# Patient Record
Sex: FEMALE | URBAN - METROPOLITAN AREA
[De-identification: names, ages, dates, MRNs, and addresses within clinical notes are randomized per-mention and may not be internally consistent; named-entity substitution may affect disease eponyms.]

---

## 2021-04-08 DIAGNOSIS — Z23 ENCOUNTER FOR IMMUNIZATION: ICD-10-CM

## 2024-03-19 ENCOUNTER — TELEPHONE (OUTPATIENT)
Age: 59
End: 2024-03-19

## 2024-12-27 ENCOUNTER — EVALUATION (OUTPATIENT)
Dept: PHYSICAL THERAPY | Facility: MEDICAL CENTER | Age: 59
End: 2024-12-27
Payer: COMMERCIAL

## 2024-12-27 DIAGNOSIS — M16.12 UNILATERAL PRIMARY OSTEOARTHRITIS, LEFT HIP: Primary | ICD-10-CM

## 2024-12-27 PROCEDURE — 97161 PT EVAL LOW COMPLEX 20 MIN: CPT | Performed by: PHYSICAL THERAPIST

## 2024-12-27 NOTE — LETTER
2024    Amor Pearce MD  [address] [address]    Patient: Barbara Rodriguez   YOB: 1965   Date of Visit: 2024     Encounter Diagnosis     ICD-10-CM    1. Unilateral primary osteoarthritis, left hip  M16.12           Dear Dr. Pearce:    Thank you for your recent referral of Barbara Rodriguez. Please review the attached evaluation summary from Barbara's recent visit.     Please verify that you agree with the plan of care by signing the attached order.     If you have any questions or concerns, please do not hesitate to call.     I sincerely appreciate the opportunity to share in the care of one of your patients and hope to have another opportunity to work with you in the near future.       Sincerely,    Starr Plummer, PT      Referring Provider:      I certify that I have read the below Plan of Care and certify the need for these services furnished under this plan of treatment while under my care.                    Amor Pearce MD  [address] [address]  Via Fax: [phone]          PT Evaluation     Today's date: 2024  Patient name: Barbara Rodriguez  : 1965  MRN: [id number]  Referring provider: Amor Pearce MD  Dx:   Encounter Diagnosis     ICD-10-CM    1. Unilateral primary osteoarthritis, left hip  M16.12                      Assessment  Impairments: abnormal muscle firing, abnormal muscle tone, abnormal or restricted ROM, activity intolerance, impaired physical strength, lacks appropriate home exercise program, pain with function, participation limitations and activity limitations  Functional limitations: squatting, sit to stand transfers, standing  Symptom irritability: low    Assessment details: Barbara Rodriguez is a pleasant 59 y.o. female who presents with chronic L hip pain gradually worsening over the last 8-9 months. She is scheduled for a L CAROL on 25. No further referral is necessary at this  time based upon examination results.    Primary movement impairment is L hip hypomobility, which contributes to signs and symptoms consistent with chronic mechanical L hip pain, and limits her ability to perform transfers and squat. Patient also presents with L hip girdle/quadriceps weakness, which further limits her functional activity tolerance. Patient responded well to manual therapy today with improvements in L hip IR PROM post-tx. She was also educated in an illustrated HEP for hip mobility and hip girdle/quad strengthening, and she was able to complete program without pain. Plan to re-evaluate in 1 month for HEP progressions, gait training with SPC, and post-operative HEP education. Patient would benefit from skilled PT services to address impairments to maximize function. Thank you for the referral.   Barriers to therapy: none  Understanding of Dx/Px/POC: good     Prognosis: good  Prognosis details: Positive prognostic factors include positive attitude towards recovery. No negative prognostic factors.    Goals  STG:  Patient will be independent with home exercise program.   Patient will increase L hip IR PROM by at least 5-10 deg to improve ability to perform transfers.  LTG:  Patient will increase L hip strength by at least 1-2 grades via MMT to improve standing tolerance.  Patient will be able to stand to rise from a chair with less difficulty.  Patient will be independent with post-operative HEP for edema control, hip mobility, and quad activation.    Plan  Patient would benefit from: skilled physical therapy  Referral necessary: No  Planned modality interventions: cryotherapy and TENS    Planned therapy interventions: abdominal trunk stabilization, activity modification, IASTM, kinesiology taping, joint mobilization, manual therapy, massage, Montano taping, balance, neuromuscular re-education, motor coordination training, patient/caregiver education, strengthening, stretching, therapeutic activities,  therapeutic exercise, gait training, functional ROM exercises, flexibility, graded exercise, graded activity, home exercise program and body mechanics training    Frequency: 1x month  Plan of Care beginning date: 2024  Plan of Care expiration date: 2025  Treatment plan discussed with: patient  Plan details: Plan to follow-up in 1 month for hip mobility/strengthening HEP progressions, post-operative gait training with SPC, and post-operative HEP education. Will then re-evaluate after surgery.      Subjective Evaluation    History of Present Illness  Mechanism of injury: This is a 59 y.o. female presenting with L hip pain for the last few years that has gradually worsened over the last 8-9 months. The pain is located in the front of her L hip and occasionally radiates into her thigh. She also occasionally has L glut pain. She received an injection in spring 2024 with some temporary relief. However, the pain progressively returned and limited her ability to squat, run, and don/doff clothing. She received another injection in 2024, which helped her pain and improved her ability to don/doff clothing. However, she continues to have weakness in her L leg along with difficulty rising from a squat. She received x-rays of her L hip that showed arthritis, and she is scheduled for a L CAROL on 25.             Recurrent probem    Quality of life: excellent    Patient Goals  Patient goals for therapy: decreased pain, increased strength and return to sport/leisure activities  Patient goal: to be able to hike, to be able to live active lifestyle  Pain  Current pain ratin  At best pain ratin  At worst pain ratin  Location: L anterior thigh/L glut  Quality: tight  Relieving factors: heat  Aggravating factors: standing (squatting, sit to stand transfers)      Diagnostic Tests  X-ray: abnormal (per patient- arthritis)  Treatments  Previous treatment: injection treatment (x2)      Objective     Active  Range of Motion   Left Hip   Flexion: 100 degrees   Abduction: 55 degrees   External rotation (90/90): 40 degrees   Internal rotation (90/90): 25 degrees     Right Hip   Flexion: 105 degrees   Abduction: 55 degrees   External rotation (90/90): 50 degrees   Internal rotation (90/90): 45 degrees     Passive Range of Motion   Left Hip   Flexion: 100 degrees   Abduction: 55 degrees   External rotation (90/90): 50 degrees   External rotation (prone): 50 degrees   Internal rotation (90/90): 25 degrees   Internal rotation (prone): 25 degrees     Right Hip   Flexion: 105 degrees   Abduction: 55 degrees   External rotation (90/90): 55 degrees   External rotation (prone): 55 degrees   Internal rotation (90/90): 45 degrees   Internal rotation (prone): 30 degrees     Strength/Myotome Testing     Left Hip   Planes of Motion   Flexion: 4  Extension: 3+  Abduction: 3+  External rotation: 3+  Internal rotation: 2+    Right Hip   Planes of Motion   Flexion: 5  Extension: 4+  Abduction: 4+  External rotation: 4+  Internal rotation: 4+    Left Knee   Flexion: 5  Extension: 4  Quadriceps contraction: good    Right Knee   Flexion: 5  Extension: 5  Quadriceps contraction: good    Tests     Left Hip   Positive scour.   Negative LIZETTE and FADIR.     Right Hip   Negative LIZETTE, FADIR and scour.     Functional Assessment      Squat    Left tibial anterior translation beyond toes and right tibial anterior translation beyond toes.     Single Leg Squat   Left Leg  Left trunk side bending, sitting toward left side and valgus.     Right Leg  Right trunk side bending.     Single Leg Stance   Left: 10 seconds  Right: 10 seconds    General Comments:      Hip Comments   Moderate limitations in L piriformis flexibility             Precautions: hx of basal cell carcinoma     *LATEX ALLERGY    HEP: seated piriformis stretch, QS (towel knee), SLR, sidelying hip ABD, sidelying hip ADD, prone hip EXT, supine clams (GTB)- latex free, bridges  Manuals 12/27   "          L hip inferolateral glides KP Gr. III             x5'                                      Neuro Re-Ed                                                                                                        Ther Ex             Rec bike DAVIS NV            QS 5\"x30 towel knee HEP            SLR 2x10 HEP            Sidelying hip ABD 2x10 HEP            Sidelying hip ADD 2x10 HEP            Prone hip EXT 2x10 HEP            Supine clams (LATEX FREE) 5\"x20 GTB HEP             Bridges 2x10 HEP            Seated piriformis stretch 20\"x3 HEP            NV- educate in post-op HEP             Ther Activity                                       Gait Training             Stair negotiation with [facility] NV                         Modalities                                                             "

## 2024-12-27 NOTE — PROGRESS NOTES
PT Evaluation     Today's date: 2024  Patient name: Barbara Rodriguez  : 1965  MRN: [id number]  Referring provider: Amor Pearce MD  Dx:   Encounter Diagnosis     ICD-10-CM    1. Unilateral primary osteoarthritis, left hip  M16.12                      Assessment  Impairments: abnormal muscle firing, abnormal muscle tone, abnormal or restricted ROM, activity intolerance, impaired physical strength, lacks appropriate home exercise program, pain with function, participation limitations and activity limitations  Functional limitations: squatting, sit to stand transfers, standing  Symptom irritability: low    Assessment details: Barbara Rodriguez is a pleasant 59 y.o. female who presents with chronic L hip pain gradually worsening over the last 8-9 months. She is scheduled for a L CAROL on 25. No further referral is necessary at this time based upon examination results.    Primary movement impairment is L hip hypomobility, which contributes to signs and symptoms consistent with chronic mechanical L hip pain, and limits her ability to perform transfers and squat. Patient also presents with L hip girdle/quadriceps weakness, which further limits her functional activity tolerance. Patient responded well to manual therapy today with improvements in L hip IR PROM post-tx. She was also educated in an illustrated HEP for hip mobility and hip girdle/quad strengthening, and she was able to complete program without pain. Plan to re-evaluate in 1 month for HEP progressions, gait training with SPC, and post-operative HEP education. Patient would benefit from skilled PT services to address impairments to maximize function. Thank you for the referral.   Barriers to therapy: none  Understanding of Dx/Px/POC: good     Prognosis: good  Prognosis details: Positive prognostic factors include positive attitude towards recovery. No negative prognostic factors.    Goals  STG:  Patient will be independent with home exercise  program.   Patient will increase L hip IR PROM by at least 5-10 deg to improve ability to perform transfers.  LTG:  Patient will increase L hip strength by at least 1-2 grades via MMT to improve standing tolerance.  Patient will be able to stand to rise from a chair with less difficulty.  Patient will be independent with post-operative HEP for edema control, hip mobility, and quad activation.    Plan  Patient would benefit from: skilled physical therapy  Referral necessary: No  Planned modality interventions: cryotherapy and TENS    Planned therapy interventions: abdominal trunk stabilization, activity modification, IASTM, kinesiology taping, joint mobilization, manual therapy, massage, Montano taping, balance, neuromuscular re-education, motor coordination training, patient/caregiver education, strengthening, stretching, therapeutic activities, therapeutic exercise, gait training, functional ROM exercises, flexibility, graded exercise, graded activity, home exercise program and body mechanics training    Frequency: 1x month  Plan of Care beginning date: 12/27/2024  Plan of Care expiration date: 1/24/2025  Treatment plan discussed with: patient  Plan details: Plan to follow-up in 1 month for hip mobility/strengthening HEP progressions, post-operative gait training with SPC, and post-operative HEP education. Will then re-evaluate after surgery.      Subjective Evaluation    History of Present Illness  Mechanism of injury: This is a 59 y.o. female presenting with L hip pain for the last few years that has gradually worsened over the last 8-9 months. The pain is located in the front of her L hip and occasionally radiates into her thigh. She also occasionally has L glut pain. She received an injection in spring 2024 with some temporary relief. However, the pain progressively returned and limited her ability to squat, run, and don/doff clothing. She received another injection in October 2024, which helped her pain and  improved her ability to don/doff clothing. However, she continues to have weakness in her L leg along with difficulty rising from a squat. She received x-rays of her L hip that showed arthritis, and she is scheduled for a L CAROL on 25.             Recurrent probem    Quality of life: excellent    Patient Goals  Patient goals for therapy: decreased pain, increased strength and return to sport/leisure activities  Patient goal: to be able to hike, to be able to live active lifestyle  Pain  Current pain ratin  At best pain ratin  At worst pain ratin  Location: L anterior thigh/L glut  Quality: tight  Relieving factors: heat  Aggravating factors: standing (squatting, sit to stand transfers)      Diagnostic Tests  X-ray: abnormal (per patient- arthritis)  Treatments  Previous treatment: injection treatment (x2)      Objective     Active Range of Motion   Left Hip   Flexion: 100 degrees   Abduction: 55 degrees   External rotation (90/90): 40 degrees   Internal rotation (90/90): 25 degrees     Right Hip   Flexion: 105 degrees   Abduction: 55 degrees   External rotation (90/90): 50 degrees   Internal rotation (90/90): 45 degrees     Passive Range of Motion   Left Hip   Flexion: 100 degrees   Abduction: 55 degrees   External rotation (90/90): 50 degrees   External rotation (prone): 50 degrees   Internal rotation (90/90): 25 degrees   Internal rotation (prone): 25 degrees     Right Hip   Flexion: 105 degrees   Abduction: 55 degrees   External rotation (90/90): 55 degrees   External rotation (prone): 55 degrees   Internal rotation (90/90): 45 degrees   Internal rotation (prone): 30 degrees     Strength/Myotome Testing     Left Hip   Planes of Motion   Flexion: 4  Extension: 3+  Abduction: 3+  External rotation: 3+  Internal rotation: 2+    Right Hip   Planes of Motion   Flexion: 5  Extension: 4+  Abduction: 4+  External rotation: 4+  Internal rotation: 4+    Left Knee   Flexion: 5  Extension: 4  Quadriceps  "contraction: good    Right Knee   Flexion: 5  Extension: 5  Quadriceps contraction: good    Tests     Left Hip   Positive scour.   Negative LIZETTE and FADIR.     Right Hip   Negative LIZETTE, FADIR and scour.     Functional Assessment      Squat    Left tibial anterior translation beyond toes and right tibial anterior translation beyond toes.     Single Leg Squat   Left Leg  Left trunk side bending, sitting toward left side and valgus.     Right Leg  Right trunk side bending.     Single Leg Stance   Left: 10 seconds  Right: 10 seconds    General Comments:      Hip Comments   Moderate limitations in L piriformis flexibility             Precautions: hx of basal cell carcinoma     *LATEX ALLERGY    HEP: seated piriformis stretch, QS (towel knee), SLR, sidelying hip ABD, sidelying hip ADD, prone hip EXT, supine clams (GTB)- latex free, bridges  Manuals 12/27            L hip inferolateral glides KP Gr. III             x5'                                      Neuro Re-Ed                                                                                                        Ther Ex             Rec bike DAVIS NV            QS 5\"x30 towel knee HEP            SLR 2x10 HEP            Sidelying hip ABD 2x10 HEP            Sidelying hip ADD 2x10 HEP            Prone hip EXT 2x10 HEP            Supine clams (LATEX FREE) 5\"x20 GTB HEP             Bridges 2x10 HEP            Seated piriformis stretch 20\"x3 HEP            NV- educate in post-op HEP             Ther Activity                                       Gait Training             Stair negotiation with [facility] [place]                         Modalities                                            "

## 2025-01-21 ENCOUNTER — OFFICE VISIT (OUTPATIENT)
Dept: PHYSICAL THERAPY | Facility: MEDICAL CENTER | Age: 60
End: 2025-01-21
Payer: COMMERCIAL

## 2025-01-21 DIAGNOSIS — M16.12 UNILATERAL PRIMARY OSTEOARTHRITIS, LEFT HIP: Primary | ICD-10-CM

## 2025-01-21 PROCEDURE — 97110 THERAPEUTIC EXERCISES: CPT | Performed by: PHYSICAL THERAPIST

## 2025-01-21 NOTE — PROGRESS NOTES
"PT Re-Evaluation    Today's date: 2025  Patient name: Barbara Rodriguez  : 1965  MRN: [id number]  Referring provider: Amor Pearce MD  Dx:   Encounter Diagnosis     ICD-10-CM    1. Unilateral primary osteoarthritis, left hip  M16.12                      Subjective: Patient reports that she has been having some discomfort in her L hip and some general muscle soreness in her lower body over the last week, but her hip has been feeling good over the past few days. She reports that she has no pain when performing her HEP. This will be her last PT session prior to undergoing a L CAROL on 25.       Objective: See treatment diary below    Passive Range of Motion   Left Hip   External rotation (prone): 50 degrees   Internal rotation (prone): 35 degrees     Right Hip   External rotation (prone): 55 degrees   Internal rotation (prone): 45 degrees     Strength  Left Hip  ABD: 4/5    Assessment: Patient has demonstrated good progress over the last month with improving her L hip IR PROM and L hip strength. Patient also responded well to manual therapy today with an improvement in IR PROM post-tx compared to presentation. Reviewed strengthening HEP today, and patient demonstrated good technique with all exercises. Patient was also educated in an illustrated HEP to be performed post-operatively for edema control, quad activation, and hip mobility. In addition, post-operative gait training with SPC was performed, and patient demonstrated independence with sequencing. Patient was also educated in proper \"step to\" mechanics for stair negotiation with SPC, and she demonstrated steady mechanics with this sequencing. Patient has met all of her goals for PT at this time. She was educated to continue hip strengthening HEP until proceeding with L CAROL and was educated to then transition to post-op HEP prior to upcoming post-op PT eval. Patient tolerated all treatment well today and completed program without pain. Plan to " "re-evaluate after L CAROL pending physician clearance.      Goals  STG:  Patient will be independent with home exercise program.- met   Patient will increase L hip IR PROM by at least 5-10 deg to improve ability to perform transfers.- met  LTG:  Patient will increase L hip strength by at least 1-2 grades via MMT to improve standing tolerance.  Patient will be able to stand to rise from a chair with less difficulty.- met  Patient will be independent with post-operative HEP for edema control, hip mobility, and quad activation.- met    Plan: Discharge from formal PT for patient to work on hip mobility/stability HEP and then transition to post-op HEP after surgery. Will re-evaluate and treat post-operatively after L CAROL.     Precautions: hx of basal cell carcinoma     *LATEX ALLERGY    HEP: seated piriformis stretch, QS (towel knee), SLR, sidelying hip ABD, sidelying hip ADD, prone hip EXT, supine clams (GTB)- latex free, bridges    Post-op HEP: ankle pumps, QS (towel knee), GS, heel slides, LAQ  Manuals 12/27 1/21           L hip inferolateral glides KP Gr. III KP Gr. III            x5' x5'                                     Neuro Re-Ed                                                                                                          Ther Ex             Rec bike DAVIS NV 5'           QS 5\"x30 towel knee HEP Post-op HEP education           SLR 2x10 HEP 3x10           Sidelying hip ABD 2x10 HEP 3x10           Sidelying hip ADD 2x10 HEP 3x10           Prone hip EXT 2x10 HEP 3x10           Supine clams (LATEX FREE) 5\"x20 GTB HEP  HEP            Supine hip ADD isometrics  5\"x30           Bridges 2x10 HEP 2x10           Seated piriformis stretch 20\"x3 HEP HEP           Ankle pumps  Post-op HEP education           Heel slides  Post-op HEP education           Glut sets  Post-op HEP education           LAQ  Post-op HEP education           Ther Activity                                       Gait Training             SPC " ambulation/stair negotiation with [facility] NV X1' amb, X5' stairs no riser                        Modalities

## 2025-02-21 ENCOUNTER — OFFICE VISIT (OUTPATIENT)
Dept: PHYSICAL THERAPY | Facility: MEDICAL CENTER | Age: 60
End: 2025-02-21
Payer: COMMERCIAL

## 2025-02-21 DIAGNOSIS — Z96.642 S/P TOTAL LEFT HIP ARTHROPLASTY: Primary | ICD-10-CM

## 2025-02-21 DIAGNOSIS — Z47.89 ORTHOPEDIC AFTERCARE: ICD-10-CM

## 2025-02-21 PROCEDURE — 97164 PT RE-EVAL EST PLAN CARE: CPT | Performed by: PHYSICAL THERAPIST

## 2025-02-21 NOTE — LETTER
2025    Amor Pearce MD  [address] [facility] [address]    Patient: Barbara Rodriguez   YOB: 1965   Date of Visit: 2025     Encounter Diagnosis     ICD-10-CM    1. S/P total left hip arthroplasty  Z96.642       2. Orthopedic aftercare  Z47.89           Dear Dr. Pearce:    Thank you for your recent referral of Barbara Rodriguez. Please review the attached evaluation summary from Barbara's recent visit.     Please verify that you agree with the plan of care by signing the attached order.     If you have any questions or concerns, please do not hesitate to call.     I sincerely appreciate the opportunity to share in the care of one of your patients and hope to have another opportunity to work with you in the near future.       Sincerely,    Starr Plummer, PT      Referring Provider:      I certify that I have read the below Plan of Care and certify the need for these services furnished under this plan of treatment while under my care.                    Amor Pearce MD  [address] [facility] [address]  Via Fax: [phone]          PT Re-Evaluation     Today's date: 2025  Patient name: Barbara Rodriguez  : 1965  MRN: [id number]  Referring provider: Amor Pearce MD  Dx:   Encounter Diagnosis     ICD-10-CM    1. S/P total left hip arthroplasty  Z96.642       2. Orthopedic aftercare  Z47.89                      Assessment  Impairments: abnormal gait, abnormal muscle firing, abnormal muscle tone, abnormal or restricted ROM, abnormal movement, activity intolerance, impaired balance, impaired physical strength, lacks appropriate home exercise program, pain with function, participation limitations and activity limitations  Functional limitations: ambulation, transfers, donning/doffing clothing, stair negotiation, exercise routine  Symptom irritability: low    Assessment details: Barbara Rodriguez is a pleasant 59 y.o. female presenting 3  days s/p L CAROL (anterior lateral approach) (DOS: 02/18/25). She reports no complications from surgery and was discharged home the same day. No signs of DVT or infection are present. No further referral is necessary at this time based upon examination results.      Incision on her L anterior hip was visualized with steri-strips intact. No active bleeding, no drainage, no excessive erythema, and no excessive edema was present. Patient was educated to continue to monitor the region and was educated to contact physician if she develops fever, nausea/vomiting, chills, drainage, excessive erythema, and/or excessive warmth surrounding the incision. She verbalized understanding.     Primary movement impairment diagnosis is L hip hypomobility as expected 3 days s/p L CAROL (anterior lateral approach) (DOS: 02/18/25), which limits her ability to don/doff clothing and perform transfers. Patient also presents with L hip edema as expected s/p L CAROL, which further limits her functional activity tolerance. In addition, strength deficits throughout the L LE contribute to balance and gait dysfunction and prevent her from ambulating to her prior capacity. She responded well to manual therapy with improvements in L hip PROM in all planes post-tx. She was also educated in an illustrated HEP for edema control, gentle hip mobility, and quad activation, and she was able to complete exercises without pain. Patient would benefit from skilled PT services to address the listed impairments to facilitate a return to PLOF. Thank you for the referral.  Barriers to therapy: none  Understanding of Dx/Px/POC: excellent     Prognosis: excellent  Prognosis details: Positive prognostic factors include positive attitude towards recovery. No negative prognostic factors.    Goals  STG (2 weeks):  1. Patient will be independence in illustrated HEP.  2. Patient will demonstrate decreased swelling in L hip to improve ROM for transfers.  LTG (6 weeks to  discharge):  1. Patient will increase L hip FLX AROM to  deg to be able to ascend stairs.  2. Patient will increase L hip AROM to be nearly comparable to the contralateral side in all planes to be able to ambulate longer distances.  3. Patient will increase L hip strength to at least 4+/5 in all planes to be able to participate in recreational activities and household chores.  4. Patient will be able to manage symptoms independently.    Plan  Patient would benefit from: skilled physical therapy  Referral necessary: No  Planned modality interventions: cryotherapy and thermotherapy: hydrocollator packs    Planned therapy interventions: abdominal trunk stabilization, activity modification, IASTM, joint mobilization, kinesiology taping, manual therapy, massage, Montano taping, motor coordination training, neuromuscular re-education, balance, balance/weight bearing training, body mechanics training, patient/caregiver education, postural training, strengthening, stretching, flexibility, functional ROM exercises, gait training, graded activity, graded exercise, home exercise program, therapeutic activities and therapeutic exercise    Frequency: 2x week (tapering to 1x/week)  Duration in weeks: 6  Plan of Care beginning date: 2/21/2025  Plan of Care expiration date: 3/28/2025  Treatment plan discussed with: patient and family  Plan details: Prognosis is above given PT services 2x/week tapering to 1x/week for 6 weeks and given HEP adherence.      Subjective Evaluation    History of Present Illness  Date of surgery: 2/18/2025  Mechanism of injury: surgery  Mechanism of injury: This is a 59 y.o. female presenting 3 days s/p L CAROL (anterior lateral approach) (DOS: 02/18/25). She has a hx of L hip pain for the last few years that has gradually worsened over the last 8-9 months. She has trialed injections with no long-term relief. She reports no complications from surgery, and she was discharged home the following day.  She reports that overall her pain has been well controlled since surgery, and she is taking pain medication approx. 1 time per day. She has some discomfort in the front of her thigh, but she is progressing well overall. She has been walking with a RW, and she has a cane at home.  Quality of life: excellent    Patient Goals  Patient goals for therapy: decreased pain, increased strength, return to sport/leisure activities, independence with ADLs/IADLs, increased motion and improved balance  Patient goal: to be able to hike, to be able to return to exercise routine, to be able to walk  Pain  Current pain ratin  At best pain ratin  At worst pain rating: 3  Location: L quadriceps  Quality: burning  Relieving factors: ice and rest  Aggravating factors: standing and walking  Progression: improved    Social Support  Stairs in house: yes   Lives in: multiple-level home      Diagnostic Tests  X-ray: abnormal (prior to surgery- arthritis)  Treatments  Previous treatment: injection treatment and physical therapy (prior to surgery)      Objective     Observations   Left Hip  Positive for edema (moderate- L anterior thigh) and incision.     Additional Observation Details  Incision on L anterior hip was visualized with steri-strips intact. No drainage, no active bleeding, no excessive erythema present.      Patient presents with moderate edema in L anterior thigh as expected post-operatively. No excessive edema present in L knee or calf.      Active Range of Motion   Left Hip   Flexion: 90 degrees   Abduction: 15 degrees   External rotation (90/90): Left hip active external rotation 90/90: deferred due to post-op protocol.   Internal rotation (90/90): Left hip active internal rotation 90/90: deferred due to post-op protocol.     Right Hip   Flexion: 100 degrees   Abduction: 55 degrees   External rotation (90/90): 30 degrees   Internal rotation (90/90): 25 degrees     Passive Range of Motion   Left Hip   Flexion: 90 degrees  "  Abduction: 20 degrees   External rotation (90/90): Left hip passive external rotation 90/90: deferred due to post-op period.     Right Hip   Flexion: 105 degrees   Abduction: 55 degrees   External rotation (90/90): 45 degrees   Internal rotation (90/90): 30 degrees     Strength/Myotome Testing     Additional Strength Details  Strength testing deferred due to post-op period    Tests     Additional Tests Details  Special testing deferred due to post-op period    Ambulation   Weight-Bearing Status   Weight-Bearing Status (Left): weight-bearing as tolerated   Assistive device used: front-wheeled walker    Observational Gait   Gait: antalgic     General Comments:      Hip Comments   Able to independently transfer L LE             Precautions: s/p L CAROL (anterior lateral approach) (DOS 02/18/25), hx of basal cell carcinoma      *LATEX ALLERGY    *TRIM SUTURES TO SKIN LEVEL POST OP DAY 10 (2/28/25) (TRIM FLUSH TO SKIN)  *ALLOW STERI-STRIPS TO FALL OFF (MAY REMOVE ON POST OP DAY 20 IF THEY HAVE NOT FALLEN OFF) (3/10/25)    HEP: AP, GS, QS (towel knee), heel slides, LAQ  Manuals 2/21            L hip PROM                                                    Neuro Re-Ed                                                                                                        Ther Ex             Ankle pumps X30 HEP            GS 5\"x30 HEP            QS 5\"x30 towel under knee HEP + towel under ankle nv           Heel slides 5\"x30 HEP            SAQ NV            LAQ 5\"x20 HEP            Heel raises NV            Mini squats NV            HEP education and instruction             Ther Activity                                       Gait Training                                       Modalities             CP to L hip PRN x10'                                               " equivocal

## 2025-02-21 NOTE — PROGRESS NOTES
PT Re-Evaluation     Today's date: 2025  Patient name: Barbara Rodriguez  : 1965  MRN: [id number]  Referring provider: Amor Pearce MD  Dx:   Encounter Diagnosis     ICD-10-CM    1. S/P total left hip arthroplasty  Z96.642       2. Orthopedic aftercare  Z47.89                      Assessment  Impairments: abnormal gait, abnormal muscle firing, abnormal muscle tone, abnormal or restricted ROM, abnormal movement, activity intolerance, impaired balance, impaired physical strength, lacks appropriate home exercise program, pain with function, participation limitations and activity limitations  Functional limitations: ambulation, transfers, donning/doffing clothing, stair negotiation, exercise routine  Symptom irritability: low    Assessment details: Barbara Rodriguez is a pleasant 59 y.o. female presenting 3 days s/p L CAROL (anterior lateral approach) (DOS: 25). She reports no complications from surgery and was discharged home the same day. No signs of DVT or infection are present. No further referral is necessary at this time based upon examination results.      Incision on her L anterior hip was visualized with steri-strips intact. No active bleeding, no drainage, no excessive erythema, and no excessive edema was present. Patient was educated to continue to monitor the region and was educated to contact physician if she develops fever, nausea/vomiting, chills, drainage, excessive erythema, and/or excessive warmth surrounding the incision. She verbalized understanding.     Primary movement impairment diagnosis is L hip hypomobility as expected 3 days s/p L CAROL (anterior lateral approach) (DOS: 25), which limits her ability to don/doff clothing and perform transfers. Patient also presents with L hip edema as expected s/p L CAROL, which further limits her functional activity tolerance. In addition, strength deficits throughout the L LE contribute to balance and gait dysfunction and prevent her from  ambulating to her prior capacity. She responded well to manual therapy with improvements in L hip PROM in all planes post-tx. She was also educated in an illustrated HEP for edema control, gentle hip mobility, and quad activation, and she was able to complete exercises without pain. Patient would benefit from skilled PT services to address the listed impairments to facilitate a return to PLOF. Thank you for the referral.  Barriers to therapy: none  Understanding of Dx/Px/POC: excellent     Prognosis: excellent  Prognosis details: Positive prognostic factors include positive attitude towards recovery. No negative prognostic factors.    Goals  STG (2 weeks):  1. Patient will be independence in illustrated HEP.  2. Patient will demonstrate decreased swelling in L hip to improve ROM for transfers.  LTG (6 weeks to discharge):  1. Patient will increase L hip FLX AROM to  deg to be able to ascend stairs.  2. Patient will increase L hip AROM to be nearly comparable to the contralateral side in all planes to be able to ambulate longer distances.  3. Patient will increase L hip strength to at least 4+/5 in all planes to be able to participate in recreational activities and household chores.  4. Patient will be able to manage symptoms independently.    Plan  Patient would benefit from: skilled physical therapy  Referral necessary: No  Planned modality interventions: cryotherapy and thermotherapy: hydrocollator packs    Planned therapy interventions: abdominal trunk stabilization, activity modification, IASTM, joint mobilization, kinesiology taping, manual therapy, massage, Montano taping, motor coordination training, neuromuscular re-education, balance, balance/weight bearing training, body mechanics training, patient/caregiver education, postural training, strengthening, stretching, flexibility, functional ROM exercises, gait training, graded activity, graded exercise, home exercise program, therapeutic activities  and therapeutic exercise    Frequency: 2x week (tapering to 1x/week)  Duration in weeks: 6  Plan of Care beginning date: 2025  Plan of Care expiration date: 3/28/2025  Treatment plan discussed with: patient and family  Plan details: Prognosis is above given PT services 2x/week tapering to 1x/week for 6 weeks and given HEP adherence.      Subjective Evaluation    History of Present Illness  Date of surgery: 2025  Mechanism of injury: surgery  Mechanism of injury: This is a 59 y.o. female presenting 3 days s/p L CAROL (anterior lateral approach) (DOS: 25). She has a hx of L hip pain for the last few years that has gradually worsened over the last 8-9 months. She has trialed injections with no long-term relief. She reports no complications from surgery, and she was discharged home the following day. She reports that overall her pain has been well controlled since surgery, and she is taking pain medication approx. 1 time per day. She has some discomfort in the front of her thigh, but she is progressing well overall. She has been walking with a RW, and she has a cane at home.  Quality of life: excellent    Patient Goals  Patient goals for therapy: decreased pain, increased strength, return to sport/leisure activities, independence with ADLs/IADLs, increased motion and improved balance  Patient goal: to be able to hike, to be able to return to exercise routine, to be able to walk  Pain  Current pain ratin  At best pain ratin  At worst pain rating: 3  Location: L quadriceps  Quality: burning  Relieving factors: ice and rest  Aggravating factors: standing and walking  Progression: improved    Social Support  Stairs in house: yes   Lives in: multiple-level home      Diagnostic Tests  X-ray: abnormal (prior to surgery- arthritis)  Treatments  Previous treatment: injection treatment and physical therapy (prior to surgery)      Objective     Observations   Left Hip  Positive for edema (moderate- L anterior  thigh) and incision.     Additional Observation Details  Incision on L anterior hip was visualized with steri-strips intact. No drainage, no active bleeding, no excessive erythema present.      Patient presents with moderate edema in L anterior thigh as expected post-operatively. No excessive edema present in L knee or calf.      Active Range of Motion   Left Hip   Flexion: 90 degrees   Abduction: 15 degrees   External rotation (90/90): Left hip active external rotation 90/90: deferred due to post-op protocol.   Internal rotation (90/90): Left hip active internal rotation 90/90: deferred due to post-op protocol.     Right Hip   Flexion: 100 degrees   Abduction: 55 degrees   External rotation (90/90): 30 degrees   Internal rotation (90/90): 25 degrees     Passive Range of Motion   Left Hip   Flexion: 90 degrees   Abduction: 20 degrees   External rotation (90/90): Left hip passive external rotation 90/90: deferred due to post-op period.     Right Hip   Flexion: 105 degrees   Abduction: 55 degrees   External rotation (90/90): 45 degrees   Internal rotation (90/90): 30 degrees     Strength/Myotome Testing     Additional Strength Details  Strength testing deferred due to post-op period    Tests     Additional Tests Details  Special testing deferred due to post-op period    Ambulation   Weight-Bearing Status   Weight-Bearing Status (Left): weight-bearing as tolerated   Assistive device used: front-wheeled walker    Observational Gait   Gait: antalgic     General Comments:      Hip Comments   Able to independently transfer L LE             Precautions: s/p L CAROL (anterior lateral approach) (DOS 02/18/25), hx of basal cell carcinoma      *LATEX ALLERGY    *TRIM SUTURES TO SKIN LEVEL POST OP DAY 10 (2/28/25) (TRIM FLUSH TO SKIN)  *ALLOW STERI-STRIPS TO FALL OFF (MAY REMOVE ON POST OP DAY 20 IF THEY HAVE NOT FALLEN OFF) (3/10/25)    HEP: AP, GS, QS (towel knee), heel slides, LAQ  Manuals 2/21            L hip PROM            "                                         Neuro Re-Ed                                                                                                        Ther Ex             Ankle pumps X30 HEP            GS 5\"x30 HEP            QS 5\"x30 towel under knee HEP + towel under ankle nv           Heel slides 5\"x30 HEP            SAQ NV            LAQ 5\"x20 HEP            Heel raises NV            Mini squats NV            HEP education and instruction             Ther Activity                                       Gait Training                                       Modalities             CP to L hip PRN x10'                              "

## 2025-02-25 ENCOUNTER — OFFICE VISIT (OUTPATIENT)
Dept: PHYSICAL THERAPY | Facility: MEDICAL CENTER | Age: 60
End: 2025-02-25
Payer: COMMERCIAL

## 2025-02-25 DIAGNOSIS — Z96.642 S/P TOTAL LEFT HIP ARTHROPLASTY: ICD-10-CM

## 2025-02-25 DIAGNOSIS — Z47.89 ORTHOPEDIC AFTERCARE: Primary | ICD-10-CM

## 2025-02-25 PROCEDURE — 97110 THERAPEUTIC EXERCISES: CPT | Performed by: PHYSICAL THERAPIST

## 2025-02-25 NOTE — PROGRESS NOTES
Daily Note     Today's date: 2025  Patient name: Barbara Rodriguez  : 1965  MRN: [id number]  Referring provider: Amor Pearce MD  Dx:   Encounter Diagnosis     ICD-10-CM    1. Orthopedic aftercare  Z47.89       2. S/P total left hip arthroplasty  Z96.642                      Subjective: Patient reports that she has some fatigue at times after being on her feet for long periods of time, but she is feeling improved overall. She is able to complete more of her daily tasks. She also feels stronger on the stairs.      Objective: See treatment diary below      Assessment: Incision on L anterior hip was visualized with steri-strips partially intact. No excessive erythema, no active bleeding, and no drainage present. Edema in L anterior thigh was reduced today. Patient responded well to L hip PROM with improvements in multiplanar PROM in all planes post-tx. Initiated gentle hip mobility and quad/hip girdle strengthening program. Able to complete independent SLR without lag, which demonstrates good progress toward her goals. Trialed SPC ambulation, and patient was educated in 3-point gait pattern. Patient demonstrated good stability and balance when ambulating with SPC. Patient was educated that she may begin to utilize SPC for household ambulation as tolerated and was educated to use RW for longer distance community ambulation. Patient tolerated treatment well and completed program without pain. Patient would benefit from continued PT to address impairments to achieve goals.      Plan: Continue per plan of care.      Precautions: s/p L CAROL (anterior lateral approach) (DOS 25), hx of basal cell carcinoma      *LATEX ALLERGY    *TRIM SUTURES TO SKIN LEVEL POST OP DAY 10 (25) (TRIM FLUSH TO SKIN)  *ALLOW STERI-STRIPS TO FALL OFF (MAY REMOVE ON POST OP DAY 20 IF THEY HAVE NOT FALLEN OFF) (3/10/25)    HEP: AP, GS, QS (towel knee), heel slides, LAQ  Manuals            L hip PROM  KP                "                                   Neuro Re-Ed                                                                                                        Ther Ex             Ankle pumps X30 HEP            GS 5\"x30 HEP 5\"x30           QS 5\"x30 towel under knee HEP 5\"x30 towel under knee/5\"x20 towel under ankle           Heel slides 5\"x30 HEP 5\"x30           SLR  x5           SAQ NV 5\"x30           LAQ 5\"x20 HEP 5\"x30           Heel raises NV 5\"x30           Mini squats NV 5\"x20           HEP education and instruction             Ther Activity                                       Gait Training             SPC ambulation  x3'                        Modalities             CP to L hip PRN x10' x10'                             "

## 2025-02-28 ENCOUNTER — OFFICE VISIT (OUTPATIENT)
Dept: PHYSICAL THERAPY | Facility: MEDICAL CENTER | Age: 60
End: 2025-02-28
Payer: COMMERCIAL

## 2025-02-28 DIAGNOSIS — Z47.89 ORTHOPEDIC AFTERCARE: Primary | ICD-10-CM

## 2025-02-28 DIAGNOSIS — Z96.642 S/P TOTAL LEFT HIP ARTHROPLASTY: ICD-10-CM

## 2025-02-28 PROCEDURE — 97110 THERAPEUTIC EXERCISES: CPT | Performed by: PHYSICAL THERAPIST

## 2025-02-28 NOTE — PROGRESS NOTES
Daily Note     Today's date: 2025  Patient name: Barbara Rodriguez  : 1965  MRN: [id number]  Referring provider: Amor Pearce MD  Dx:   Encounter Diagnosis     ICD-10-CM    1. Orthopedic aftercare  Z47.89       2. S/P total left hip arthroplasty  Z96.642                      Subjective: Patient reports that she is continuing to notice improvements, and she has been able to do more walking in her house with her cane. Her leg feels fatigued after she is on her feet for long periods of time, but she is able to complete more of her daily tasks.      Objective: See treatment diary below      Assessment: Incision on L anterior hip was visualized with steri-strips partially intact. Incision is healing well with no active bleeding, no erythema, and no drainage present. Sutures were trimmed to skin level per MD order. No abnormalities were present after suture trimming. Patient continues to respond well to manual therapy with an improvement in L hip PROM in all planes post-tx compared to presentation. L hip PROM also improved today in all planes compared to last visit. Able to initiate resistance for SAQ/LAQ, which demonstrates an improvement in quad strength. Patient demonstrated increased gait speed and increased b/l step length during SPC ambulation today. She was educated to continue to ambulate with her SPC for household ambulation as tolerated but was educated to continue to use RW when ambulating in crowded community environments. Patient tolerated treatment well and completed program without pain. Patient would benefit from continued PT to further improve hip mobility and strength to achieve goals.      Plan: Continue per plan of care.      Precautions: s/p L CAROL (anterior lateral approach) (DOS 25), hx of basal cell carcinoma      *LATEX ALLERGY    *TRIM SUTURES TO SKIN LEVEL POST OP DAY 10 (25) (TRIM FLUSH TO SKIN)  *ALLOW STERI-STRIPS TO FALL OFF (MAY REMOVE ON POST OP DAY 20 IF THEY HAVE  "NOT FALLEN OFF) (3/10/25)    HEP: AP, GS, QS (towel knee), heel slides, LAQ  Manuals 2/21 2/25 2/28          L hip PROM  KP KP          Suture trimming to skin level post-op day 10                                       Neuro Re-Ed                                                                                                        Ther Ex             Ankle pumps X30 HEP            GS 5\"x30 HEP 5\"x30           QS 5\"x30 towel under knee HEP 5\"x30 towel under knee/5\"x20 towel under ankle 5\"x30 towel under knee/5\"x30 towel under ankle          Heel slides 5\"x30 HEP 5\"x30 5\"x30          SLR  x5 3x5          SAQ NV 5\"x30 5\"x30 1#          LAQ 5\"x20 HEP 5\"x30 5\"x30 1#          Heel raises NV 5\"x30 5\"x30          Mini squats NV 5\"x20 5\"x30          HEP education and instruction             Ther Activity                                       Gait Training             SPC ambulation  x3' x3'                       Modalities             CP to L hip PRN x10' x10' At home                              "

## 2025-03-04 ENCOUNTER — OFFICE VISIT (OUTPATIENT)
Dept: PHYSICAL THERAPY | Facility: MEDICAL CENTER | Age: 60
End: 2025-03-04
Payer: COMMERCIAL

## 2025-03-04 DIAGNOSIS — Z96.642 S/P TOTAL LEFT HIP ARTHROPLASTY: ICD-10-CM

## 2025-03-04 DIAGNOSIS — Z47.89 ORTHOPEDIC AFTERCARE: Primary | ICD-10-CM

## 2025-03-04 PROCEDURE — 97110 THERAPEUTIC EXERCISES: CPT | Performed by: PHYSICAL THERAPIST

## 2025-03-04 NOTE — PROGRESS NOTES
Daily Note     Today's date: 3/4/2025  Patient name: Barbara Rodriguez  : 1965  MRN: [id number]  Referring provider: Amor Pearce MD  Dx:   Encounter Diagnosis     ICD-10-CM    1. Orthopedic aftercare  Z47.89       2. S/P total left hip arthroplasty  Z96.642                      Subjective: Patient reports that she has some mild soreness in her thigh today after vacuuming and cleaning 2 days ago, but she is continuing to notice improvements overall. She has been doing more walking with her cane and only uses her RW at night when she is fatigued.      Objective: See treatment diary below      Assessment: Incision on L anterior hip was visualized and is healing well. No active bleeding, no drainage, and no erythema present. Patient continues to demonstrate increased gait speed with SPC and increased b/l step length when ambulating with her SPC each session. L hip PROM is also steadily improving in all planes each session. In addition, she was able to add resistance for SLR, which demonstrates an improvement in quad strength. Also able to perform wall ball squats today, which further demonstrates good progress. Patient tolerated treatment well and completed program without pain. Patient would benefit from continued PT to further improve hip mobility and strength to achieve goals.      Plan: Continue per plan of care.      Precautions: s/p L CAROL (anterior lateral approach) (DOS 25), hx of basal cell carcinoma      *LATEX ALLERGY    *TRIM SUTURES TO SKIN LEVEL POST OP DAY 10 (25) (TRIM FLUSH TO SKIN)  *ALLOW STERI-STRIPS TO FALL OFF (MAY REMOVE ON POST OP DAY 20 IF THEY HAVE NOT FALLEN OFF) (3/10/25)    HEP: AP, GS, QS (towel knee), heel slides, LAQ  Manuals  3/4         L hip PROM  KP KP KP         Suture trimming to skin level post-op day 10   KP                                    Neuro Re-Ed                                                                                                 "        Ther Ex             Ankle pumps X30 HEP            GS 5\"x30 HEP 5\"x30           QS 5\"x30 towel under knee HEP 5\"x30 towel under knee/5\"x20 towel under ankle 5\"x30 towel under knee/5\"x30 towel under ankle 5\"x30 towel under knee/5\"x30 towel under ankle         Heel slides 5\"x30 HEP 5\"x30 5\"x30 5\"x30         Supine hip ADD isometrics    5\"x30         SLR  x5 3x5 3x10 1#         SAQ NV 5\"x30 5\"x30 1# 5\"x30 1#         LAQ 5\"x20 HEP 5\"x30 5\"x30 1# 5\"x30 1#         Heel raises NV 5\"x30 5\"x30 5\"x30         Mini squats NV 5\"x20 5\"x30 X30 wall ball         HEP education and instruction             Ther Activity                                       Gait Training             SPC ambulation  x3' x3'                       Modalities             CP to L hip PRN x10' x10' At home                                "

## 2025-03-07 ENCOUNTER — OFFICE VISIT (OUTPATIENT)
Dept: PHYSICAL THERAPY | Facility: MEDICAL CENTER | Age: 60
End: 2025-03-07
Payer: COMMERCIAL

## 2025-03-07 DIAGNOSIS — Z47.89 ORTHOPEDIC AFTERCARE: Primary | ICD-10-CM

## 2025-03-07 DIAGNOSIS — Z96.642 S/P TOTAL LEFT HIP ARTHROPLASTY: ICD-10-CM

## 2025-03-07 PROCEDURE — 97110 THERAPEUTIC EXERCISES: CPT | Performed by: PHYSICAL THERAPIST

## 2025-03-07 NOTE — PROGRESS NOTES
"Daily Note     Today's date: 3/7/2025  Patient name: Barbara Rodriguez  : 1965  MRN: [id number]  Referring provider: Amor Pearce MD  Dx:   Encounter Diagnosis     ICD-10-CM    1. Orthopedic aftercare  Z47.89       2. S/P total left hip arthroplasty  Z96.642                      Subjective: Patient reports that she continues to notice improvements, and she is able to do more walking with her cane at home. She did not have any significant soreness after last session.      Objective: See treatment diary below      Assessment: Incision on L anterior hip was visualized and is healing well. No erythema, no active bleeding, no drainage was present. Patient continues to demonstrate improved quality of gait mechanics each session. Patient also continues to respond well to L hip PROM with improvements in multiplanar hip PROM post-tx. Able to increase resistance for SAQ/LAQ, which demonstrates an improvement in quad strength. Also added step ups to further improve CKC strength. Patient tolerated treatment well and completed program without pain. Patient would benefit from continued PT to further progress treatment to achieve goals.      Plan: Continue per plan of care.      Precautions: s/p L CAROL (anterior lateral approach) (DOS 25), hx of basal cell carcinoma      *LATEX ALLERGY    *TRIM SUTURES TO SKIN LEVEL POST OP DAY 10 (25) (TRIM FLUSH TO SKIN)  *ALLOW STERI-STRIPS TO FALL OFF (MAY REMOVE ON POST OP DAY 20 IF THEY HAVE NOT FALLEN OFF) (3/10/25)    HEP: AP, GS, QS (towel knee), heel slides, LAQ  Manuals 2/21 2/25 2/28 3/4 3        L hip PROM  KP KP KP KP        Suture trimming to skin level post-op day 10   KP                                    Neuro Re-Ed                                                                                                        Ther Ex             Ankle pumps X30 HEP            GS 5\"x30 HEP 5\"x30           QS 5\"x30 towel under knee HEP 5\"x30 towel under knee/5\"x20 towel " "under ankle 5\"x30 towel under knee/5\"x30 towel under ankle 5\"x30 towel under knee/5\"x30 towel under ankle 5\"x30 towel under ankle        Heel slides 5\"x30 HEP 5\"x30 5\"x30 5\"x30 5\"x30        Supine hip ADD isometrics    5\"x30 5\"x30        SLR  x5 3x5 3x10 1# 3x10 1#        SAQ NV 5\"x30 5\"x30 1# 5\"x30 1# 5\"x30 2#        LAQ 5\"x20 HEP 5\"x30 5\"x30 1# 5\"x30 1# 5\"x30 2#        Heel raises NV 5\"x30 5\"x30 5\"x30 5\"x30        Mini squats NV 5\"x20 5\"x30 X30 wall ball X30 wall ball        Step ups     2x10 no riser        HEP education and instruction             Ther Activity                                       Gait Training             SPC ambulation  x3' x3'                       Modalities             CP to L hip PRN x10' x10' At home                                  "

## 2025-03-11 ENCOUNTER — OFFICE VISIT (OUTPATIENT)
Dept: PHYSICAL THERAPY | Facility: MEDICAL CENTER | Age: 60
End: 2025-03-11
Payer: COMMERCIAL

## 2025-03-11 DIAGNOSIS — Z47.89 ORTHOPEDIC AFTERCARE: Primary | ICD-10-CM

## 2025-03-11 DIAGNOSIS — Z96.642 S/P TOTAL LEFT HIP ARTHROPLASTY: ICD-10-CM

## 2025-03-11 PROCEDURE — 97110 THERAPEUTIC EXERCISES: CPT | Performed by: PHYSICAL THERAPIST

## 2025-03-11 NOTE — PROGRESS NOTES
"Daily Note     Today's date: 3/11/2025  Patient name: Barbara Rodriguez  : 1965  MRN: [id number]  Referring provider: Amor Pearce MD  Dx:   Encounter Diagnosis     ICD-10-CM    1. Orthopedic aftercare  Z47.89       2. S/P total left hip arthroplasty  Z96.642                      Subjective: Patient reports that her hip is feeling really good, and she is able to complete more of her daily tasks. She is also able to go up the stairs leading with her L leg. She has a follow-up with her surgeon this Thursday (3/13).      Objective: See treatment diary below      Assessment: Incision on L anterior hip was visualized and is healing well with no drainage, no erythema, no active bleeding present. Patient continues to demonstrate improvements in L hip AROM and PROM in all planes each session.  Able to progress reps for step ups, which demonstrates an improvement in LE endurance. Also able to initiate recumbent biking, which further demonstrates good progress toward her goals. Patient tolerated treatment well and completed program without pain. Patient would benefit from continued PT to further improve hip ROM and strength to facilitate a full return to active lifestyle.      Plan: Continue per plan of care.      Precautions: s/p L CAROL (anterior lateral approach) (DOS 25), hx of basal cell carcinoma      *LATEX ALLERGY    *TRIM SUTURES TO SKIN LEVEL POST OP DAY 10 (25) (TRIM FLUSH TO SKIN)  *ALLOW STERI-STRIPS TO FALL OFF (MAY REMOVE ON POST OP DAY 20 IF THEY HAVE NOT FALLEN OFF) (3/10/25)    HEP: AP, GS, QS (towel knee), heel slides, LAQ  Manuals  3/4 3/7 3/11       L hip PROM  KP KP KP KP KP       Suture trimming to skin level post-op day 10   KP                                    Neuro Re-Ed                                                                                                        Ther Ex             Ankle pumps X30 HEP            GS 5\"x30 HEP 5\"x30           QS 5\"x30 towel " "under knee HEP 5\"x30 towel under knee/5\"x20 towel under ankle 5\"x30 towel under knee/5\"x30 towel under ankle 5\"x30 towel under knee/5\"x30 towel under ankle 5\"x30 towel under ankle 5\"x30 towel under ankle       Heel slides 5\"x30 HEP 5\"x30 5\"x30 5\"x30 5\"x30 5\"x30       Supine hip ADD isometrics    5\"x30 5\"x30 5\"x30       SLR  x5 3x5 3x10 1# 3x10 1# 3x10 1#       SAQ NV 5\"x30 5\"x30 1# 5\"x30 1# 5\"x30 2# 5\"x30 2#       LAQ 5\"x20 HEP 5\"x30 5\"x30 1# 5\"x30 1# 5\"x30 2# 5\"x30 2#       Heel raises NV 5\"x30 5\"x30 5\"x30 5\"x30 5\"x30       Mini squats NV 5\"x20 5\"x30 X30 wall ball X30 wall ball X30 wall ball       Standing marches      3\"x10 ea       Step ups     2x10 no riser 3x10 no riser       Rec bike      2'       HEP education and instruction             Ther Activity                                       Gait Training             SPC ambulation  x3' x3'                       Modalities             CP to L hip PRN x10' x10' At home                                    "

## 2025-03-14 ENCOUNTER — OFFICE VISIT (OUTPATIENT)
Dept: PHYSICAL THERAPY | Facility: MEDICAL CENTER | Age: 60
End: 2025-03-14
Payer: COMMERCIAL

## 2025-03-14 DIAGNOSIS — Z96.642 S/P TOTAL LEFT HIP ARTHROPLASTY: ICD-10-CM

## 2025-03-14 DIAGNOSIS — Z47.89 ORTHOPEDIC AFTERCARE: Primary | ICD-10-CM

## 2025-03-14 PROCEDURE — 97110 THERAPEUTIC EXERCISES: CPT | Performed by: PHYSICAL THERAPIST

## 2025-03-14 NOTE — PROGRESS NOTES
Daily Note     Today's date: 3/14/2025  Patient name: Barbara Rodriguez  : 1965  MRN: [id number]  Referring provider: Amor Pearce MD  Dx:   Encounter Diagnosis     ICD-10-CM    1. Orthopedic aftercare  Z47.89       2. S/P total left hip arthroplasty  Z96.642                      Subjective: Patient reports that she had a follow-up with her surgeon's office yesterday, and she reports that they are pleased with her progress. She reports that they took x-rays that showed good healing. Her hip is continuing to feel stronger, and she continues to improve her ability to go up and down the stairs. She was also able to complete a light aerobics workout at home this morning without difficulty. She has been walking without her cane at home but uses her cane for long community distances.      Objective: See treatment diary below      Assessment: Patient continues to demonstrate steady progress with improving L hip AROM and PROM in all planes each week. Quad control continues to improve upon SLR. Able to increase resistance for SAQ and LAQ, which further demonstrates an improvement in quad strength. Also able to increase height for step ups and initiate leg press today, which demonstrates good progress toward her goals. Patient tolerated treatment well and completed program without pain. Patient would benefit from continued PT to further progress treatment to facilitate a full return to active lifestyle.      Plan: Continue per plan of care.      Precautions: s/p L CAROL (anterior lateral approach) (DOS 25), hx of basal cell carcinoma      *LATEX ALLERGY    *TRIM SUTURES TO SKIN LEVEL POST OP DAY 10 (25) (TRIM FLUSH TO SKIN)  *ALLOW STERI-STRIPS TO FALL OFF (MAY REMOVE ON POST OP DAY 20 IF THEY HAVE NOT FALLEN OFF) (3/10/25)    HEP: AP, GS, QS (towel knee), heel slides, LAQ  Manuals  3 3/7 3/11 3/14      L hip PROM  KP KP KP KP KP KP      Suture trimming to skin level post-op day 10   KP        "                             Neuro Re-Ed                                                                                                        Ther Ex             Ankle pumps X30 HEP            GS 5\"x30 HEP 5\"x30           QS 5\"x30 towel under knee HEP 5\"x30 towel under knee/5\"x20 towel under ankle 5\"x30 towel under knee/5\"x30 towel under ankle 5\"x30 towel under knee/5\"x30 towel under ankle 5\"x30 towel under ankle 5\"x30 towel under ankle HEP      Heel slides 5\"x30 HEP 5\"x30 5\"x30 5\"x30 5\"x30 5\"x30 HEP      Supine hip ADD isometrics    5\"x30 5\"x30 5\"x30 5\"x30      SLR  x5 3x5 3x10 1# 3x10 1# 3x10 1# 3x10 1#      SAQ NV 5\"x30 5\"x30 1# 5\"x30 1# 5\"x30 2# 5\"x30 2# 5\"x30 3#      LAQ 5\"x20 HEP 5\"x30 5\"x30 1# 5\"x30 1# 5\"x30 2# 5\"x30 2# 5\"x30 3#      Heel raises NV 5\"x30 5\"x30 5\"x30 5\"x30 5\"x30 5\"x30      Mini squats NV 5\"x20 5\"x30 X30 wall ball X30 wall ball X30 wall ball X30 wall ball      Standing marches      3\"x10 ea HEP      Step ups     2x10 no riser 3x10 no riser 3x10 L1      Leg press       3x10 seated seat 6 40#      Rec bike      2' 5'      HEP education and instruction             Ther Activity                                       Gait Training             SPC ambulation  x3' x3'                       Modalities             CP to L hip PRN x10' x10' At home                                      "

## 2025-03-18 ENCOUNTER — OFFICE VISIT (OUTPATIENT)
Dept: PHYSICAL THERAPY | Facility: MEDICAL CENTER | Age: 60
End: 2025-03-18
Payer: COMMERCIAL

## 2025-03-18 DIAGNOSIS — Z96.642 S/P TOTAL LEFT HIP ARTHROPLASTY: ICD-10-CM

## 2025-03-18 DIAGNOSIS — Z47.89 ORTHOPEDIC AFTERCARE: Primary | ICD-10-CM

## 2025-03-18 PROCEDURE — 97110 THERAPEUTIC EXERCISES: CPT | Performed by: PHYSICAL THERAPIST

## 2025-03-18 NOTE — PROGRESS NOTES
Daily Note     Today's date: 3/18/2025  Patient name: Barbara Rodriguez  : 1965  MRN: [id number]  Referring provider: Amor Pearce MD  Dx:   Encounter Diagnosis     ICD-10-CM    1. Orthopedic aftercare  Z47.89       2. S/P total left hip arthroplasty  Z96.642                      Subjective: Patient reports that her hip is feeling really good. She did not have any soreness after last session, and she is able to complete more of her daily exercise routine. She is also able to go up and down the stairs with less difficulty.      Objective: See treatment diary below      Assessment: Patient continues to respond well to manual therapy with an improvement in L hip PROM in all planes post-tx compared to presentation. L hip PROM is continuing to steadily improve in all planes each visit. Able to initiate resistance for wall ball squats and increase resistance for leg press, which demonstrates an improvement in CKC strength. Also able to increase height for step ups, which further demonstrates good progress toward her goals. Added step downs with cueing provided to prevent lateral pelvic drop. Patient tolerated treatment well and completed program without pain. Patient would benefit from continued PT to further progress treatment to facilitate a full return to PLOF.      Plan: Continue per plan of care.      Precautions: s/p L CAROL (anterior lateral approach) (DOS 25), hx of basal cell carcinoma      *LATEX ALLERGY    *TRIM SUTURES TO SKIN LEVEL POST OP DAY 10 (25) (TRIM FLUSH TO SKIN)  *ALLOW STERI-STRIPS TO FALL OFF (MAY REMOVE ON POST OP DAY 20 IF THEY HAVE NOT FALLEN OFF) (3/10/25)    HEP: AP, GS, QS (towel knee), heel slides, LAQ  Manuals  3 3/7 3/11 3/14 3/18     L hip PROM  KP KP KP KP KP KP KP     Suture trimming to skin level post-op day 10   KP                                    Neuro Re-Ed                                                                                             "            Ther Ex             Ankle pumps X30 HEP            GS 5\"x30 HEP 5\"x30           QS 5\"x30 towel under knee HEP 5\"x30 towel under knee/5\"x20 towel under ankle 5\"x30 towel under knee/5\"x30 towel under ankle 5\"x30 towel under knee/5\"x30 towel under ankle 5\"x30 towel under ankle 5\"x30 towel under ankle HEP      Heel slides 5\"x30 HEP 5\"x30 5\"x30 5\"x30 5\"x30 5\"x30 HEP      Supine hip ADD isometrics    5\"x30 5\"x30 5\"x30 5\"x30 5\"x30     SLR  x5 3x5 3x10 1# 3x10 1# 3x10 1# 3x10 1# 3x10 1#     SAQ NV 5\"x30 5\"x30 1# 5\"x30 1# 5\"x30 2# 5\"x30 2# 5\"x30 3# 5\"x30 3#     LAQ 5\"x20 HEP 5\"x30 5\"x30 1# 5\"x30 1# 5\"x30 2# 5\"x30 2# 5\"x30 3# 5\"x30 3#     Heel raises NV 5\"x30 5\"x30 5\"x30 5\"x30 5\"x30 5\"x30      Mini squats NV 5\"x20 5\"x30 X30 wall ball X30 wall ball X30 wall ball X30 wall ball X30 wall ball 5#     Standing marches      3\"x10 ea HEP HEP     Step ups     2x10 no riser 3x10 no riser 3x10 L1 X30 L2     Step downs        2x10 no riser     Leg press       3x10 seated seat 6 40# 3x10 seated seat 6 50#     Rec bike      2' 5' 5'     HEP education and instruction             Ther Activity                                       Gait Training             SPC ambulation  x3' x3'                       Modalities             CP to L hip PRN x10' x10' At home                                        "

## 2025-03-21 ENCOUNTER — OFFICE VISIT (OUTPATIENT)
Dept: PHYSICAL THERAPY | Facility: MEDICAL CENTER | Age: 60
End: 2025-03-21
Payer: COMMERCIAL

## 2025-03-21 DIAGNOSIS — Z96.642 S/P TOTAL LEFT HIP ARTHROPLASTY: ICD-10-CM

## 2025-03-21 DIAGNOSIS — Z47.89 ORTHOPEDIC AFTERCARE: Primary | ICD-10-CM

## 2025-03-21 PROCEDURE — 97110 THERAPEUTIC EXERCISES: CPT | Performed by: PHYSICAL THERAPIST

## 2025-03-21 NOTE — LETTER
2025    Amor Pearce MD  [address] [place] [id number]    Patient: Barbara Rodriguez   YOB: 1965   Date of Visit: 3/21/2025     Encounter Diagnosis     ICD-10-CM    1. Orthopedic aftercare  Z47.89       2. S/P total left hip arthroplasty  Z96.642           Dear Dr. Pearce:    Thank you for your recent referral of Barbara Rodriguez. Please review the attached evaluation summary from Barbara's recent visit.     Please verify that you agree with the plan of care by signing the attached order.     If you have any questions or concerns, please do not hesitate to call.     I sincerely appreciate the opportunity to share in the care of one of your patients and hope to have another opportunity to work with you in the near future.       Sincerely,    Starr Plummer, PT      Referring Provider:      I certify that I have read the below Plan of Care and certify the need for these services furnished under this plan of treatment while under my care.                    Amor Pearce MD  [address] [place] [id number]  Via Fax: [phone]          Progress Note     Today's date: 3/21/2025  Patient name: Barbara Rodriguez  : 1965  MRN: [id number]  Referring provider: Amor Pearce MD  Dx:   Encounter Diagnosis     ICD-10-CM    1. Orthopedic aftercare  Z47.89       2. S/P total left hip arthroplasty  Z96.642                      Subjective: Patient reports that she is feeling significantly improved. She is able to go up and down her stairs, don/doff clothing, and complete more of her daily household tasks. She has some mild soreness today in her R hip and low back after doing a lot of standing yesterday, but her L hip is feeling much stronger. She would like to continue PT to work on her strength to be able to walk longer distances on uneven surfaces.      Objective: See treatment diary below      Active Range of Motion   Left Hip   Flexion: 95 degrees    Abduction: 50 degrees   External rotation (90/90): 30 degrees  Internal rotation (90/90): 25 degrees    Right Hip   Flexion: 100 degrees   Abduction: 55 degrees   External rotation (90/90): 30 degrees   Internal rotation (90/90): 25 degrees     Passive Range of Motion   Left Hip   Flexion: 100 degrees   Abduction: 50 degrees   External rotation (90/90): 30 degrees  Internal rotation (90/90): 25 degrees    Right Hip   Flexion: 105 degrees   Abduction: 55 degrees   External rotation (90/90): 45 degrees   Internal rotation (90/90): 30 degrees     Strength  Left Hip  Flexion: 3+/5  Abduction: 3+/5    Right Hip  Flexion: 5/5  Abduction: 5/5    Assessment: Patient has demonstrated excellent progress with improving her L hip AROM and PROM in all planes 4 weeks s/p L CAROL (DOS: 02/18/25), which has improved her ability to perform transfers and don/doff clothing. Patient has also demonstrated good progress with improving her L hip strength, which has improved her ability to negotiate stairs and ambulate shorter distances. Although improved significantly, mild limitations in end-range L hip PROM and mild strength deficits in the L hip when compared to the contralateral side prevent her from ambulating long community distances on uneven surfaces and hiking to her prior capacity. Patient is demonstrating good overall progress toward her goals. She tolerated all progressions well today and completed program without pain. Patient would benefit from continued PT to further progress strengthening program to facilitate a full return to active lifestyle to her prior capacity.    Goals  STG (2 weeks):  1. Patient will be independence in illustrated HEP.- met  2. Patient will demonstrate decreased swelling in L hip to improve ROM for transfers.- met  LTG (6 weeks to discharge):  1. Patient will increase L hip FLX AROM to  deg to be able to ascend stairs.- met  2. Patient will increase L hip AROM to be nearly comparable to the  "contralateral side in all planes to be able to ambulate longer distances.- in progress (improved)  3. Patient will increase L hip strength to at least 4+/5 in all planes to be able to participate in recreational activities and household chores.- in progress (improved)  4. Patient will be able to manage symptoms independently.- in progress    Plan: Due to good progress, plan to reduce frequency to 1x/week for 3 additional weeks after this week.     Precautions: s/p L CAROL (anterior lateral approach) (DOS 02/18/25), hx of basal cell carcinoma      *LATEX ALLERGY    HEP: AP, GS, QS (towel knee), heel slides, LAQ  Manuals 3/7 3/11 3/14 3/18 3/21    L hip PROM KP KP KP KP KP                      Neuro Re-Ed                                                                        Ther Ex         QS 5\"x30 towel under ankle 5\"x30 towel under ankle HEP      Heel slides 5\"x30 5\"x30 HEP      Supine hip ADD isometrics 5\"x30 5\"x30 5\"x30 5\"x30 5\"x35    SLR 3x10 1# 3x10 1# 3x10 1# 3x10 1# 2x10 2#    SAQ 5\"x30 2# 5\"x30 2# 5\"x30 3# 5\"x30 3# 5\"x30 3#    LAQ 5\"x30 2# 5\"x30 2# 5\"x30 3# 5\"x30 3# 5\"x30 3#    Heel raises 5\"x30 5\"x30 5\"x30      Mini squats X30 wall ball X30 wall ball X30 wall ball X30 wall ball 5# X30 wall ball 5#    Standing marches  3\"x10 ea HEP HEP HEP    Step ups 2x10 no riser 3x10 no riser 3x10 L1 X30 L2 X30 L2    Step downs    2x10 no riser 3x10 no riser    Leg press   3x10 seated seat 6 40# 3x10 seated seat 6 50# 3x10 seated seat 6 60#     Rec bike  2' 5' 5' 5'    HEP education and instruction         Ther Activity                           Gait Training                  Modalities         CP to L hip PRN                                                     "

## 2025-03-21 NOTE — PROGRESS NOTES
Progress Note     Today's date: 3/21/2025  Patient name: Barbara Rodriguez  : 1965  MRN: [id number]  Referring provider: Amor Pearce MD  Dx:   Encounter Diagnosis     ICD-10-CM    1. Orthopedic aftercare  Z47.89       2. S/P total left hip arthroplasty  Z96.642                      Subjective: Patient reports that she is feeling significantly improved. She is able to go up and down her stairs, don/doff clothing, and complete more of her daily household tasks. She has some mild soreness today in her R hip and low back after doing a lot of standing yesterday, but her L hip is feeling much stronger. She would like to continue PT to work on her strength to be able to walk longer distances on uneven surfaces.      Objective: See treatment diary below      Active Range of Motion   Left Hip   Flexion: 95 degrees   Abduction: 50 degrees   External rotation (90/90): 30 degrees  Internal rotation (90/90): 25 degrees    Right Hip   Flexion: 100 degrees   Abduction: 55 degrees   External rotation (90/90): 30 degrees   Internal rotation (90/90): 25 degrees     Passive Range of Motion   Left Hip   Flexion: 100 degrees   Abduction: 50 degrees   External rotation (90/90): 30 degrees  Internal rotation (90/90): 25 degrees    Right Hip   Flexion: 105 degrees   Abduction: 55 degrees   External rotation (90/90): 45 degrees   Internal rotation (90/90): 30 degrees     Strength  Left Hip  Flexion: 3+/5  Abduction: 3+/5    Right Hip  Flexion: 5/5  Abduction: 5/5    Assessment: Patient has demonstrated excellent progress with improving her L hip AROM and PROM in all planes 4 weeks s/p L CAROL (DOS: 25), which has improved her ability to perform transfers and don/doff clothing. Patient has also demonstrated good progress with improving her L hip strength, which has improved her ability to negotiate stairs and ambulate shorter distances. Although improved significantly, mild limitations in end-range L hip PROM and mild strength  "deficits in the L hip when compared to the contralateral side prevent her from ambulating long community distances on uneven surfaces and hiking to her prior capacity. Patient is demonstrating good overall progress toward her goals. She tolerated all progressions well today and completed program without pain. Patient would benefit from continued PT to further progress strengthening program to facilitate a full return to active lifestyle to her prior capacity.    Goals  STG (2 weeks):  1. Patient will be independence in illustrated HEP.- met  2. Patient will demonstrate decreased swelling in L hip to improve ROM for transfers.- met  LTG (6 weeks to discharge):  1. Patient will increase L hip FLX AROM to  deg to be able to ascend stairs.- met  2. Patient will increase L hip AROM to be nearly comparable to the contralateral side in all planes to be able to ambulate longer distances.- in progress (improved)  3. Patient will increase L hip strength to at least 4+/5 in all planes to be able to participate in recreational activities and household chores.- in progress (improved)  4. Patient will be able to manage symptoms independently.- in progress    Plan: Due to good progress, plan to reduce frequency to 1x/week for 3 additional weeks after this week.     Precautions: s/p L CAROL (anterior lateral approach) (DOS 02/18/25), hx of basal cell carcinoma      *LATEX ALLERGY    HEP: AP, GS, QS (towel knee), heel slides, LAQ  Manuals 3/7 3/11 3/14 3/18 3/21    L hip PROM KP KP KP KP KP                      Neuro Re-Ed                                                                        Ther Ex         QS 5\"x30 towel under ankle 5\"x30 towel under ankle HEP      Heel slides 5\"x30 5\"x30 HEP      Supine hip ADD isometrics 5\"x30 5\"x30 5\"x30 5\"x30 5\"x35    SLR 3x10 1# 3x10 1# 3x10 1# 3x10 1# 2x10 2#    SAQ 5\"x30 2# 5\"x30 2# 5\"x30 3# 5\"x30 3# 5\"x30 3#    LAQ 5\"x30 2# 5\"x30 2# 5\"x30 3# 5\"x30 3# 5\"x30 3#    Heel raises 5\"x30 5\"x30 " "5\"x30      Mini squats X30 wall ball X30 wall ball X30 wall ball X30 wall ball 5# X30 wall ball 5#    Standing marches  3\"x10 ea HEP HEP HEP    Step ups 2x10 no riser 3x10 no riser 3x10 L1 X30 L2 X30 L2    Step downs    2x10 no riser 3x10 no riser    Leg press   3x10 seated seat 6 40# 3x10 seated seat 6 50# 3x10 seated seat 6 60#     Rec bike  2' 5' 5' 5'    HEP education and instruction         Ther Activity                           Gait Training                  Modalities         CP to L hip PRN                                     "

## 2025-03-25 ENCOUNTER — APPOINTMENT (OUTPATIENT)
Dept: PHYSICAL THERAPY | Facility: MEDICAL CENTER | Age: 60
End: 2025-03-25
Payer: COMMERCIAL

## 2025-03-28 ENCOUNTER — OFFICE VISIT (OUTPATIENT)
Dept: PHYSICAL THERAPY | Facility: MEDICAL CENTER | Age: 60
End: 2025-03-28
Payer: COMMERCIAL

## 2025-03-28 DIAGNOSIS — Z47.89 ORTHOPEDIC AFTERCARE: Primary | ICD-10-CM

## 2025-03-28 DIAGNOSIS — Z96.642 S/P TOTAL LEFT HIP ARTHROPLASTY: ICD-10-CM

## 2025-03-28 PROCEDURE — 97110 THERAPEUTIC EXERCISES: CPT | Performed by: PHYSICAL THERAPIST

## 2025-03-28 NOTE — PROGRESS NOTES
"Daily Note     Today's date: 3/28/2025  Patient name: Barbara Rodriguez  : 1965  MRN: [id number]  Referring provider: Amor Pearce MD  Dx:   Encounter Diagnosis     ICD-10-CM    1. Orthopedic aftercare  Z47.89       2. S/P total left hip arthroplasty  Z96.642                      Subjective: Patient reports that her hip has been feeling good, and she is continuing to improve her ability to complete her daily tasks. She has not been having any pain over the last week.      Objective: See treatment diary below      Assessment: Continued with L hip PROM to address mild remaining L hip hypomobility, and patient responded well to manual therapy with an improvement in L hip PROM in all planes post-tx. Able to increase resistance for SAQ/LAQ, which demonstrates an improvement in quad strength. Patient was also able to increase resistance for leg press and wall ball squats, which demonstrates good progress toward her goals. Patient tolerated treatment well and completed program without pain. Patient would benefit from continued PT to further progress strengthening to facilitate a full return to PLOF.      Plan: Continue per plan of care.      Precautions: s/p L CAROL (anterior lateral approach) (DOS 25), hx of basal cell carcinoma      *LATEX ALLERGY    HEP: AP, GS, QS (towel knee), heel slides, LAQ  Manuals 3/7 3/11 3/14 3/18 3/21 3/28   L hip PROM KP KP KP KP KP KP                     Neuro Re-Ed                                                                        Ther Ex         QS 5\"x30 towel under ankle 5\"x30 towel under ankle HEP      Heel slides 5\"x30 5\"x30 HEP      Supine hip ADD isometrics 5\"x30 5\"x30 5\"x30 5\"x30 5\"x35 5\"x35   SLR 3x10 1# 3x10 1# 3x10 1# 3x10 1# 2x10 2# 3x10 2#   SAQ 5\"x30 2# 5\"x30 2# 5\"x30 3# 5\"x30 3# 5\"x30 3# 5\"x30 4#   LAQ 5\"x30 2# 5\"x30 2# 5\"x30 3# 5\"x30 3# 5\"x30 3# 5\"x30 4#   Heel raises 5\"x30 5\"x30 5\"x30      Mini squats X30 wall ball X30 wall ball X30 wall ball X30 wall ball " "5# X30 wall ball 5# X30 wall 10#   Standing marches  3\"x10 ea HEP HEP HEP HEP   Step ups 2x10 no riser 3x10 no riser 3x10 L1 X30 L2 X30 L2 X30 L2   Step downs    2x10 no riser 3x10 no riser 3x10 no riser   Leg press   3x10 seated seat 6 40# 3x10 seated seat 6 50# 3x10 seated seat 6 60#  3x10 seated seat 6 70#   Rec bike  2' 5' 5' 5' 5'   HEP education and instruction         Ther Activity                           Gait Training                  Modalities         CP to L hip PRN                                       "

## 2025-04-01 ENCOUNTER — APPOINTMENT (OUTPATIENT)
Dept: PHYSICAL THERAPY | Facility: MEDICAL CENTER | Age: 60
End: 2025-04-01
Payer: COMMERCIAL

## 2025-04-04 ENCOUNTER — OFFICE VISIT (OUTPATIENT)
Dept: PHYSICAL THERAPY | Facility: MEDICAL CENTER | Age: 60
End: 2025-04-04
Payer: COMMERCIAL

## 2025-04-04 DIAGNOSIS — Z96.642 S/P TOTAL LEFT HIP ARTHROPLASTY: ICD-10-CM

## 2025-04-04 DIAGNOSIS — Z47.89 ORTHOPEDIC AFTERCARE: Primary | ICD-10-CM

## 2025-04-04 PROCEDURE — 97110 THERAPEUTIC EXERCISES: CPT | Performed by: PHYSICAL THERAPIST

## 2025-04-04 NOTE — PROGRESS NOTES
"Daily Note     Today's date: 2025  Patient name: Barbara Rodriguez  : 1965  MRN: [id number]  Referring provider: Amor Pearce MD  Dx:   Encounter Diagnosis     ICD-10-CM    1. Orthopedic aftercare  Z47.89       2. S/P total left hip arthroplasty  Z96.642                      Subjective: Patient reports that her hip has been feeling good since her last PT session. She has been able to do more walking and has been easing into her exercise routine. In addition, she is able to complete more household tasks.      Objective: See treatment diary below      Assessment: Continued with L hip PROM to address very mild remaining L hip hypomobility, and patient responded well to manual therapy with improvements in L hip PROM in all planes post-tx. Able to increase resistance for SLR, which demonstrates an improvement in quad strength. Also able to increase resistance for step ups, which demonstrates an improvement in CKC strength. Added supine clams in the clinic and to HEP performance to further improve hip stability. Patient was educated to ease into her exercise routine and was educated to avoid any painful exercises. Patient tolerated treatment well and completed program without pain. Patient would benefit from continued PT to maximize independence with HEP prior to upcoming d/c.       Plan: Continue per plan of care. Potential d/c next visit.     Precautions: s/p L CAROL (anterior lateral approach) (DOS 25), hx of basal cell carcinoma      *LATEX ALLERGY    HEP: AP, GS, QS (towel knee), heel slides, LAQ  Manuals 3/7 3/11 3/14 3/18 3/21 3/28 4/4   L hip PROM [facility]                       Neuro Re-Ed                                                                                Ther Ex          QS 5\"x30 towel under ankle 5\"x30 towel under ankle HEP       Heel slides 5\"x30 5\"x30 HEP       Supine hip ADD isometrics 5\"x30 5\"x30 5\"x30 5\"x30 5\"x35 5\"x35 5\"x40   Supine clams (LATEX FREE)       5\"x20 " "RTB   SLR 3x10 1# 3x10 1# 3x10 1# 3x10 1# 2x10 2# 3x10 2# 3x10 3#   SAQ 5\"x30 2# 5\"x30 2# 5\"x30 3# 5\"x30 3# 5\"x30 3# 5\"x30 4# 5\"x30 4#   LAQ 5\"x30 2# 5\"x30 2# 5\"x30 3# 5\"x30 3# 5\"x30 3# 5\"x30 4# 5\"x30 4#   Heel raises 5\"x30 5\"x30 5\"x30       Mini squats X30 wall ball X30 wall ball X30 wall ball X30 wall ball 5# X30 wall ball 5# X30 wall 10# X30 wall ball 10#   Standing marches  3\"x10 ea HEP HEP HEP HEP    Step ups 2x10 no riser 3x10 no riser 3x10 L1 X30 L2 X30 L2 X30 L2 X20 L1 5#   Step downs    2x10 no riser 3x10 no riser 3x10 no riser 3x10 no riser   Leg press   3x10 seated seat 6 40# 3x10 seated seat 6 50# 3x10 seated seat 6 60#  3x10 seated seat 6 70# 3x10 seated seat 6 75#   Rec bike  2' 5' 5' 5' 5' 5'   HEP education and instruction          Ther Activity                              Gait Training                    Modalities          CP to L hip PRN                                           "

## 2025-04-08 ENCOUNTER — APPOINTMENT (OUTPATIENT)
Dept: PHYSICAL THERAPY | Facility: MEDICAL CENTER | Age: 60
End: 2025-04-08
Payer: COMMERCIAL

## 2025-04-10 ENCOUNTER — OFFICE VISIT (OUTPATIENT)
Dept: PHYSICAL THERAPY | Facility: MEDICAL CENTER | Age: 60
End: 2025-04-10
Payer: COMMERCIAL

## 2025-04-10 DIAGNOSIS — Z47.89 ORTHOPEDIC AFTERCARE: Primary | ICD-10-CM

## 2025-04-10 DIAGNOSIS — Z96.642 S/P TOTAL LEFT HIP ARTHROPLASTY: ICD-10-CM

## 2025-04-10 PROCEDURE — 97110 THERAPEUTIC EXERCISES: CPT | Performed by: PHYSICAL THERAPIST

## 2025-04-10 NOTE — PROGRESS NOTES
Discharge Summary    Today's date: 4/10/2025  Patient name: Barbara Rodriguez  : 1965  MRN: [id number]  Referring provider: Amor Pearce MD  Dx:   Encounter Diagnosis     ICD-10-CM    1. Orthopedic aftercare  Z47.89       2. S/P total left hip arthroplasty  Z96.642                      Subjective: Patient reports that her hip is continuing to feel improved overall. She has some fatigue at times after being on her feet for prolonged periods, but she is pleased with her overall progress. She is able to walk in the community, go up and down her stairs, and complete all of her daily tasks. She also has returned to her exercise routine. She is pleased with her progress and feels ready to be discharged to her [facility].      Objective: See treatment diary below    Active Range of Motion   Left Hip   Flexion: 100 degrees   Abduction: 55 degrees   External rotation (90/90): 30 degrees  Internal rotation (90/90): 25 degrees     Right Hip   Flexion: 100 degrees   Abduction: 55 degrees   External rotation (90/90): 30 degrees   Internal rotation (90/90): 25 degrees      Passive Range of Motion   Left Hip   Flexion: 105 degrees   Abduction: 55 degrees   External rotation (90/90): 45 degrees  Internal rotation (90/90): 30 degrees     Right Hip   Flexion: 105 degrees   Abduction: 55 degrees   External rotation (90/90): 45 degrees   Internal rotation (90/90): 30 degrees      Strength  Left Hip  Flexion: 5/5  Abduction: 5/5     Right Hip  Flexion: 5/5  Abduction: 5/5    Assessment: Patient has demonstrated excellent progress with improving her L hip AROM and PROM in all planes 7+ weeks s/p L CAROL, which has facilitated an improvement in her ability to don/doff clothing, perform transfers, and complete daily tasks. Patient has also demonstrated good progress with improving her L hip strength in all planes, which has allowed her to return to her exercise routine and longer distance community ambulation. Patient has met all of her  "goals for PT, and she is independent in a HEP for continued mobility and strengthening. Patient tolerated all treatment well today and completed program without pain. Patient is agreeable to be discharged to her HEP.    Goals  STG (2 weeks):  1. Patient will be independence in illustrated HEP.- met  2. Patient will demonstrate decreased swelling in L hip to improve ROM for transfers.- met  LTG (6 weeks to discharge):  1. Patient will increase L hip FLX AROM to  deg to be able to ascend stairs.- met  2. Patient will increase L hip AROM to be nearly comparable to the contralateral side in all planes to be able to ambulate longer distances.- met  3. Patient will increase L hip strength to at least 4+/5 in all planes to be able to participate in recreational activities and household chores.- met  4. Patient will be able to manage symptoms independently.- met    Plan: Discharge to HEP.     Precautions: s/p L CAROL (anterior lateral approach) (DOS 02/18/25), hx of basal cell carcinoma      *LATEX ALLERGY    HEP: AP, GS, QS (towel knee), heel slides, LAQ  Manuals 3/7 3/11 3/14 3/18 3/21 3/28 4/4 4/10   L hip PROM KP KP KP KP KP KP KP KP                         Neuro Re-Ed                                                                                        Ther Ex           QS 5\"x30 towel under ankle 5\"x30 towel under ankle HEP        Heel slides 5\"x30 5\"x30 HEP        Supine hip ADD isometrics 5\"x30 5\"x30 5\"x30 5\"x30 5\"x35 5\"x35 5\"x40 5\"x40   Supine clams (LATEX FREE)       5\"x20 RTB 5\"x30 GTB   SLR 3x10 1# 3x10 1# 3x10 1# 3x10 1# 2x10 2# 3x10 2# 3x10 3# 3x10 3#   SAQ 5\"x30 2# 5\"x30 2# 5\"x30 3# 5\"x30 3# 5\"x30 3# 5\"x30 4# 5\"x30 4# 5\"x20 5#   LAQ 5\"x30 2# 5\"x30 2# 5\"x30 3# 5\"x30 3# 5\"x30 3# 5\"x30 4# 5\"x30 4# 5\"x20 5#   Heel raises 5\"x30 5\"x30 5\"x30        Mini squats X30 wall ball X30 wall ball X30 wall ball X30 wall ball 5# X30 wall ball 5# X30 wall 10# X30 wall ball 10# X30 wall ball 10#   Standing marches  3\"x10 ea " HEP HEP HEP HEP     Step ups 2x10 no riser 3x10 no riser 3x10 L1 X30 L2 X30 L2 X30 L2 X20 L1 5# X30 L1 5#   Step downs    2x10 no riser 3x10 no riser 3x10 no riser 3x10 no riser 3x10 no riser   Standing hip ABD        2x10 b/l   Leg press   3x10 seated seat 6 40# 3x10 seated seat 6 50# 3x10 seated seat 6 60#  3x10 seated seat 6 70# 3x10 seated seat 6 75# 3x10 seated seat 6 75#   Rec bike  2' 5' 5' 5' 5' 5' 5'   HEP education and instruction           Ther Activity                                 Gait Training                      Modalities           CP to L hip PRN

## 2025-04-18 ENCOUNTER — OFFICE VISIT (OUTPATIENT)
Dept: PHYSICAL THERAPY | Facility: MEDICAL CENTER | Age: 60
End: 2025-04-18
Payer: COMMERCIAL

## 2025-04-18 DIAGNOSIS — G89.29 CHRONIC RIGHT SHOULDER PAIN: Primary | ICD-10-CM

## 2025-04-18 DIAGNOSIS — M25.511 CHRONIC RIGHT SHOULDER PAIN: Primary | ICD-10-CM

## 2025-04-18 PROCEDURE — 97161 PT EVAL LOW COMPLEX 20 MIN: CPT | Performed by: PHYSICAL THERAPIST

## 2025-04-18 PROCEDURE — 97110 THERAPEUTIC EXERCISES: CPT | Performed by: PHYSICAL THERAPIST

## 2025-04-18 NOTE — PROGRESS NOTES
PT Evaluation     Today's date: 2025  Patient name: Barbara Rodriguez  : 1965  MRN: [id number]  Referring provider: Starr Plummer PT  Dx:   Encounter Diagnosis     ICD-10-CM    1. Chronic right shoulder pain  M25.511     G89.29                      Assessment  Impairments: abnormal muscle firing, abnormal or restricted ROM, activity intolerance, impaired physical strength, lacks appropriate home exercise program, pain with function, participation limitations and activity limitations  Functional limitations: lifting, kayaking, household chores  Symptom irritability: low    Assessment details: Barbara Rodriguez is a pleasant 59 y.o. female who presents with chronic R shoulder pain for the last 10-12 years. No further referral is necessary at this time based upon examination results.    Primary movement impairment is R shoulder posterior capsule tightness, which results in signs and symptoms consistent with chronic mechanical R shoulder pain, and limits her ability to participate in recreational activities. Patient also presents with weakness of her R scapular stabilizers, which further contributes to compensatory stress on her R shoulder when lifting. Patient responded well to GH mobilizations and PROM with improvements in R shoulder BALJIT AROM BTH and FIR AROM BTB post-tx. Patient was also educated in an illustrated HEP for shoulder mobility and scapular stability, and she was able to complete exercises without pain. Patient would benefit from skilled PT services to address the listed impairments to facilitate a return to PLOF.   Barriers to therapy: none  Understanding of Dx/Px/POC: excellent     Prognosis: excellent  Prognosis details: Positive prognostic factors include positive attitude towards recovery. Negative prognostic factors include chronicity of symptoms.    Goals  STG:  Patient will be independent with home exercise program.   Patient will be able to perform a proper seated and prone  scapular retraction without compensation to reduce stress on R shoulder when lifting.  LTG:  Patient will increase R shoulder BALJIT AROM BTH and FIR AROM BTB to be within 5-10 deg of the contralateral side to be able to perform recreational activities.  Patient will increase the strength of R middle and lower trapezius to at least 4+/5 to reduce stress on R shoulder girdle when lifting.  Patient will be able to kayak.  Patient will be able to lift during exercise routine.  Patient will be able to manage symptoms independently.     Plan  Patient would benefit from: skilled physical therapy  Referral necessary: No  Planned modality interventions: cryotherapy and thermotherapy: hydrocollator packs    Planned therapy interventions: abdominal trunk stabilization, activity modification, kinesiology taping, manual therapy, joint mobilization, massage, [place] taping, motor coordination training, neuromuscular re-education, patient/caregiver education, postural training, strengthening, stretching, therapeutic activities, therapeutic exercise, home exercise program, graded exercise, functional ROM exercises, flexibility and body mechanics training    Frequency: 1x month  Plan of Care beginning date: 4/18/2025  Plan of Care expiration date: 5/16/2025  Treatment plan discussed with: patient  Plan details: Due to low symptom irritability and independence with HEP, plan to follow-up in 1 month for re-assessment and potential d/c to HEP.    Subjective Evaluation    History of Present Illness  Mechanism of injury: This is a 59 y.o. female presenting with R shoulder pain for the last 10-12 years of a gradual onset. She thinks the pain may have occurred after repetitively carrying a dog leash in her R arm, but she cannot relate the onset of pain to any acute injury. She reports that she was evaluated by ortho a few years ago, and she was diagnosed with a ruptured biceps. She reports that the pain is located in the front of her R  upper arm. No radiating pain beyond her elbow and no numbness/tingling. The pain is worsened with lifting, kayaking, and using her R arm to clean.          Recurrent probem    Quality of life: excellent    Patient Goals  Patient goals for therapy: decreased pain, increased strength, independence with ADLs/IADLs, return to sport/leisure activities and increased motion  Patient goal: to be able to lift, to be able to kayak, to be able to perform exercise routine  Pain  Current pain ratin  At best pain ratin  At worst pain ratin  Location: R upper arm  Quality: tight  Aggravating factors: lifting (cleaning, kayaking)    Hand dominance: right    Treatments  No previous or current treatments      Objective     Palpation     Right   Hypertonic in the biceps.     Active Range of Motion   Cervical/Thoracic Spine       Cervical    Flexion:  WFL  Extension:  WFL  Left rotation:  WFL  Right rotation:  WFL  Left Shoulder   Flexion: 155 degrees   Abduction: 155 degrees   External rotation BTH: C7   Internal rotation BTB: T9     Right Shoulder   Flexion: 155 degrees   Abduction: 155 degrees   External rotation BTH: C4   Internal rotation BTB: L2     Additional Active Range of Motion Details  Seated cervical FLX/EXT AROM repeated motion testing unremarkable    Passive Range of Motion   Left Shoulder   Flexion: 180 degrees   Abduction: 180 degrees   External rotation 90°: 90 degrees   Internal rotation 90°: 65 degrees     Right Shoulder   Flexion: 170 degrees   Abduction: 170 degrees   External rotation 90°: 90 degrees   Internal rotation 90°: 50 degrees     Joint Play   Left Shoulder  Joints within functional limits are the posterior capsule and inferior capsule.     Right Shoulder  Hypomobile in the posterior capsule and inferior capsule.     Strength/Myotome Testing     Left Shoulder     Planes of Motion   Flexion: 5   Abduction: 5   External rotation at 0°: 5   Internal rotation at 0°: 5     Isolated Muscles  "  Biceps: 5   Lower trapezius: 4   Middle trapezius: 4+     Right Shoulder     Planes of Motion   Flexion: 5   Abduction: 4+ (pain)   External rotation at 0°: 5   Internal rotation at 0°: 5 (pain)     Isolated Muscles   Biceps: 4+   Lower trapezius: 2+   Middle trapezius: 3+     Tests     Right Shoulder   Positive Hawkin's, painful arc, Regino's sign, scapular relocation (for decreased pain/increased strength) and scapular assistance  (for decreased pain).   Negative drop arm and external rotation lag sign.            Precautions: s/p L CAROL (anterior lateral approach) (DOS 02/18/25), hx of basal cell carcinoma      *LATEX ALLERGY    HEP: supine wand ER AAROM, prone retraction, scap 4 (no band)  Manuals 4/18            R GH posterior glides KP Gr. III            R GH inferior glides  KP Gr. III            R shoulder PROM KP             x5'            Neuro Re-Ed                                                                                                        Ther Ex             Supine wand ER AAROM 5\"x20 HEP            Supine cross body stretch NV            Prone scapular retraction 2x5 HEP            Scapular row X10 HEP GTB latex free NV           Shoulder ext X10 HEP GTB latex free NV           Robberies X10 HEP GTB latex free NV           Lawn mowers X10 HEP GTB latex free NV           HEP education and instruction x15'            Ther Activity                                       Gait Training                                       Modalities                                            "

## 2025-05-16 ENCOUNTER — EVALUATION (OUTPATIENT)
Dept: PHYSICAL THERAPY | Facility: MEDICAL CENTER | Age: 60
End: 2025-05-16
Payer: COMMERCIAL

## 2025-05-16 DIAGNOSIS — G89.29 CHRONIC RIGHT SHOULDER PAIN: Primary | ICD-10-CM

## 2025-05-16 DIAGNOSIS — M25.511 CHRONIC RIGHT SHOULDER PAIN: Primary | ICD-10-CM

## 2025-05-16 PROCEDURE — 97140 MANUAL THERAPY 1/> REGIONS: CPT | Performed by: PHYSICAL THERAPIST

## 2025-05-16 PROCEDURE — 97110 THERAPEUTIC EXERCISES: CPT | Performed by: PHYSICAL THERAPIST

## 2025-05-16 NOTE — PROGRESS NOTES
Discharge Summary    Today's date: 2025  Patient name: Barbara Rodriguez  : 1965  MRN: [id number]  Referring provider: Starr Plummer, VERONICA  Dx:   Encounter Diagnosis     ICD-10-CM    1. Chronic right shoulder pain  M25.511     G89.29                      Subjective: Patient reports that she continues to have some pain in her R shoulder, but she feels relief when performing her stretching HEP. She reports that she feels ready to be discharged to her home exercise program prior to traveling out of the country for a few months.      Objective: See treatment diary below      Assessment: Patient has demonstrated improvements in her R shoulder FIR AROM BTB and has also demonstrated improvements with her ability to perform a proper scapular retraction without compensation compared to her initial visit. Performed manual therapy today to address R shoulder posterior/inferior hypomobility, and patient responded well to manual therapy with improvements in R shoulder PROM in all planes post-tx. Reviewed shoulder mobility/scapular stability HEP today, and patient was educated to add 4-way tband scapular strengthening to HEP. Patient did not meet all of her goals for PT due to continued mobility/strength deficits when compared to the contralateral side. However, she is independent in an illustrated HEP for continued mobility and strengthening. Patient tolerated all treatment well today and completed program without pain. Patient is now discharged to her HEP prior to upcoming travel. Will re-evaluate if needed in the future.     Goals  STG:  Patient will be independent with home exercise program.- met   Patient will be able to perform a proper seated and prone scapular retraction without compensation to reduce stress on R shoulder when lifting.- met  LTG:  Patient will increase R shoulder BALJIT AROM BTH and FIR AROM BTB to be within 5-10 deg of the contralateral side to be able to perform recreational activities.- not  "met (improved since IE; independent in an HEP for continued mobility)  Patient will increase the strength of R middle and lower trapezius to at least 4+/5 to reduce stress on R shoulder girdle when lifting.- not met (improved since IE; independent in a HEP for continued strengthening)  Patient will be able to kayak.- not assessed (did not attempt)  Patient will be able to lift during exercise routine.- met (continues to have some pain)  Patient will be able to manage symptoms independently.- met     Plan: Discharge to HEP. Re-evaluate if needed in the future upon return from travel.     Precautions: s/p L CAROL (anterior lateral approach) (DOS 02/18/25), hx of basal cell carcinoma      *LATEX ALLERGY    Manuals 4/18 5/16           R GH posterior glides KP Gr. III KP Gr. III           R GH inferior glides  KP Gr. III KP Gr. III           R shoulder PROM KP KP            x5' x15'           Neuro Re-Ed                                                                                                        Ther Ex             Supine wand ER AAROM 5\"x20 HEP 5\"x30           Supine cross body stretch NV            Prone scapular retraction 2x5 HEP 2x10           Scapular row X10 HEP 2x10 GTB (latex free)             Shoulder ext X10 HEP 2x10 GTB (latex free)           Robberies X10 HEP 2x10 GTB (latex free)           Lawn mowers X10 HEP 2x10 GTB (latex free)           HEP education and instruction x15' x15'           Ther Activity                                       Gait Training                                       Modalities                                            "